# Patient Record
Sex: MALE | Race: WHITE | NOT HISPANIC OR LATINO | URBAN - METROPOLITAN AREA
[De-identification: names, ages, dates, MRNs, and addresses within clinical notes are randomized per-mention and may not be internally consistent; named-entity substitution may affect disease eponyms.]

---

## 2019-09-12 ENCOUNTER — HOSPITAL ENCOUNTER (OUTPATIENT)
Dept: GENERAL RADIOLOGY | Facility: HOSPITAL | Age: 44
Discharge: HOME | End: 2019-09-12
Attending: PHYSICIAN ASSISTANT

## 2021-03-17 ENCOUNTER — HOSPITAL ENCOUNTER (OUTPATIENT)
Dept: URGENT CARE | Facility: CLINIC | Age: 46
Discharge: HOME | End: 2021-03-17
Attending: NURSE PRACTITIONER

## 2021-03-17 LAB — URATE SERPL-MCNC: 7.5 MG/DL (ref 3.5–8.5)

## 2023-08-10 ENCOUNTER — TRANSCRIBE ORDERS (OUTPATIENT)
Dept: ADMINISTRATIVE | Facility: HOSPITAL | Age: 48
End: 2023-08-10
Payer: OTHER GOVERNMENT

## 2023-08-10 DIAGNOSIS — G89.29 CHRONIC PAIN OF RIGHT KNEE: Primary | ICD-10-CM

## 2023-08-10 DIAGNOSIS — M25.561 CHRONIC PAIN OF RIGHT KNEE: Primary | ICD-10-CM

## 2023-09-05 ENCOUNTER — OFFICE VISIT (OUTPATIENT)
Dept: ORTHOPEDIC SURGERY | Facility: CLINIC | Age: 48
End: 2023-09-05
Payer: OTHER GOVERNMENT

## 2023-09-05 VITALS
WEIGHT: 315 LBS | BODY MASS INDEX: 38.36 KG/M2 | HEIGHT: 76 IN | OXYGEN SATURATION: 94 % | DIASTOLIC BLOOD PRESSURE: 83 MMHG | HEART RATE: 84 BPM | SYSTOLIC BLOOD PRESSURE: 135 MMHG

## 2023-09-05 DIAGNOSIS — M17.11 OSTEOARTHRITIS OF RIGHT KNEE, UNSPECIFIED OSTEOARTHRITIS TYPE: ICD-10-CM

## 2023-09-05 DIAGNOSIS — M25.561 RIGHT KNEE PAIN, UNSPECIFIED CHRONICITY: Primary | ICD-10-CM

## 2023-09-05 RX ORDER — FENOFIBRATE 145 MG/1
145 TABLET, COATED ORAL DAILY
COMMUNITY

## 2023-09-05 RX ORDER — RIZATRIPTAN BENZOATE 10 MG/1
10 TABLET ORAL
COMMUNITY

## 2023-09-05 RX ORDER — GLUCOSAMINE SULFATE 500 MG
CAPSULE ORAL 3 TIMES DAILY
COMMUNITY

## 2023-09-05 RX ORDER — IBUPROFEN 800 MG/1
800 TABLET ORAL
COMMUNITY

## 2023-09-05 RX ORDER — ATORVASTATIN CALCIUM 10 MG/1
10 TABLET, FILM COATED ORAL DAILY
COMMUNITY

## 2023-09-05 RX ORDER — GABAPENTIN 300 MG/1
300 CAPSULE ORAL 4 TIMES DAILY
COMMUNITY

## 2023-09-05 RX ORDER — ALBUTEROL SULFATE 90 UG/1
2 AEROSOL, METERED RESPIRATORY (INHALATION)
COMMUNITY

## 2023-09-05 RX ORDER — CETIRIZINE HYDROCHLORIDE 10 MG/1
10 TABLET ORAL DAILY
COMMUNITY

## 2023-09-05 RX ORDER — HYDROCHLOROTHIAZIDE 12.5 MG/1
12.5 TABLET ORAL DAILY
COMMUNITY

## 2023-09-05 RX ORDER — CHLORAL HYDRATE 500 MG
CAPSULE ORAL
COMMUNITY

## 2023-09-05 NOTE — PROGRESS NOTES
"Chief Complaint  Pain and Initial Evaluation of the Right Knee     Subjective      Anshul Luna presents to [facility] for evaluation of the right knee. He reports a few months ago he had severe knee pain that caused him to not be able to walk or weight bear without pain. He denies injury or trauma. He is scheduled for an MRI. He locates pain to the medial knee. He reports it has improved. He takes ibuprofen with some relief.     Allergies   Allergen Reactions    Miconazole Unknown - Low Severity     Hives,swelling, trouble breathing,bumps in his throat    Simvastatin Unknown - Low Severity     Fatigue, severe joint pain    Bacitracin-Polymyxin B Hives    Chlorhexidine Gluconate Rash    Iodine Rash        Social History     Socioeconomic History    Marital status:    Tobacco Use    Smoking status: Never    Smokeless tobacco: Never        I reviewed the patient's chief complaint, history of present illness, review of systems, past medical history, surgical history, family history, social history, medications, and allergy list.     Review of Systems     Constitutional: Denies fevers, chills, weight loss  Cardiovascular: Denies chest pain, shortness of breath  Skin: Denies rashes, acute skin changes  Neurologic: Denies headache, loss of consciousness  MSK: Right knee pain      Vital Signs:   /83   Pulse 84   Ht 193 cm (76\")   Wt (!) 156 kg (345 lb)   SpO2 94%   BMI 41.99 kg/m²          Physical Exam  General: Alert. No acute distress    Ortho Exam      Right knee- full extension. Flexion 125 degrees. Mild medial joint line tenderness. Stable to varus/valgus stress. Stable to anterior/posterior drawer. Negative Lachman's. Positive EHL, FHL, GS and TA. Sensation intact to all 5 nerves of the foot. Positive pulses. Mild swelling. No effusion. Positive Eufemia's.     Procedures    X-Ray Report:  Right knee X-Ray  Indication: Evaluation of right knee " pain  AP/Lateral, Standing, and [place] view(s)  Findings: no acute fracture. Mild degenerative changes.   Prior studies available for comparison: no      Imaging Results (Most Recent)       Procedure Component Value Units Date/Time    XR Knee 3 View Right [[id number]] Resulted: 09/05/23 0832     Updated: 09/05/23 0833             Result Review :       No results found.           Assessment and Plan     Diagnoses and all orders for this visit:    1. Right knee pain, unspecified chronicity (Primary)  -     XR Knee 3 View Right    2. Osteoarthritis of right knee, unspecified osteoarthritis type        Discussed the treatment plan with the patient.  I reviewed the x-rays that were obtained today with the patient. Plan to proceed with MRI to evaluate for meniscus injury.     Call or return if worsening symptoms.    Follow Up     MRI results      Patient was given instructions and counseling regarding his condition or for health maintenance advice. Please see specific information pulled into the AVS if appropriate.     Scribed for Jaime Desouza MD by Марина Molina.  09/05/23   08:37 EDT    I have personally performed the services described in this document as scribed by the above individual and it is both accurate and complete. Jaime Desouza MD 09/05/23

## 2023-09-11 ENCOUNTER — HOSPITAL ENCOUNTER (OUTPATIENT)
Dept: MRI IMAGING | Facility: HOSPITAL | Age: 48
Discharge: HOME | End: 2023-09-11
Admitting: PHYSICIAN ASSISTANT
Payer: OTHER GOVERNMENT

## 2023-09-11 DIAGNOSIS — G89.29 CHRONIC PAIN OF RIGHT KNEE: ICD-10-CM

## 2023-09-11 DIAGNOSIS — M25.561 CHRONIC PAIN OF RIGHT KNEE: ICD-10-CM

## 2023-09-11 PROCEDURE — 73721 MRI JNT OF LWR EXTRE W/O DYE: CPT

## 2023-09-14 ENCOUNTER — OFFICE VISIT (OUTPATIENT)
Dept: ORTHOPEDIC SURGERY | Facility: CLINIC | Age: 48
End: 2023-09-14
Payer: OTHER GOVERNMENT

## 2023-09-14 VITALS
DIASTOLIC BLOOD PRESSURE: 101 MMHG | WEIGHT: 315 LBS | HEART RATE: 72 BPM | OXYGEN SATURATION: 95 % | BODY MASS INDEX: 38.36 KG/M2 | HEIGHT: 76 IN | SYSTOLIC BLOOD PRESSURE: 157 MMHG

## 2023-09-14 DIAGNOSIS — M17.11 PRIMARY OSTEOARTHRITIS OF RIGHT KNEE: ICD-10-CM

## 2023-09-14 DIAGNOSIS — S83.241A TEAR OF MEDIAL MENISCUS OF RIGHT KNEE, CURRENT, UNSPECIFIED TEAR TYPE, INITIAL ENCOUNTER: Primary | ICD-10-CM

## 2023-09-14 DIAGNOSIS — M71.21 BAKER CYST, RIGHT: ICD-10-CM

## 2023-09-14 NOTE — PROGRESS NOTES
"Chief Complaint  Pain and Follow-up of the Right Knee     Subjective      Anshul Luna presents to [facility] for follow up evaluation of the right knee. The patient recently had an MRI and is here today for those results. To review, He reports a few months ago he had severe knee pain that caused him to not be able to walk or weight bear without pain. He denies injury or trauma. He locates pain to the medial knee. He reports it has improved. He takes ibuprofen with some relief.      Allergies   Allergen Reactions    Miconazole Unknown - Low Severity     Hives,swelling, trouble breathing,bumps in his throat    Simvastatin Unknown - Low Severity     Fatigue, severe joint pain    Bacitracin-Polymyxin B Hives    Chlorhexidine Gluconate Rash    Iodine Rash        Social History     Socioeconomic History    Marital status:    Tobacco Use    Smoking status: Never    Smokeless tobacco: Never        I reviewed the patient's chief complaint, history of present illness, review of systems, past medical history, surgical history, family history, social history, medications, and allergy list.     Review of Systems     Constitutional: Denies fevers, chills, weight loss  Cardiovascular: Denies chest pain, shortness of breath  Skin: Denies rashes, acute skin changes  Neurologic: Denies headache, loss of consciousness  MSK: Right knee pain      Vital Signs:   BP (!) 157/101 Comment: advised to see pcp  Pulse 72   Ht 193 cm (76\")   Wt (!) 156 kg (345 lb)   SpO2 95%   BMI 41.99 kg/m²          Physical Exam  General: Alert. No acute distress    Ortho Exam      Right knee- full extension. Flexion 125 degrees. Mild medial joint line tenderness. Stable to varus/valgus stress. Stable to anterior/posterior drawer. Negative Lachman's. Positive EHL, FHL, GS and TA. Sensation intact to all 5 nerves of the foot. Positive pulses. Mild swelling. No effusion. Positive Eufemia's.  "     Procedures        Imaging Results (Most Recent)       None             Result Review :       XR Knee 3 View Right    Result Date: 9/8/2023  Narrative: X-Ray Report: Right knee X-Ray Indication: Evaluation of right knee pain AP/Lateral, Standing, and [place] view(s) Findings: no acute fracture. Mild degenerative changes. Prior studies available for comparison: no     MRI Knee Right Without Contrast    Result Date: 9/12/2023  Narrative: PROCEDURE: MRI KNEE RIGHT  WO CONTRAST  COMPARISON: None  INDICATIONS: CHRONIC ALL OVER RIGHT KNEE PAIN AND SWELLING.      TECHNIQUE: A complete multi-planar MRI was performed.   FINDINGS:  The cruciate ligaments, collateral ligaments, and extensor mechanism of the knee are intact.  Degenerative myxoid changes are seen throughout the medial meniscus.  There is a transverse tear involving the root of the posterior horn of the medial meniscus.  There is partial peripheral subluxation of the body segment towards the medial gutter.  There is no evidence for lateral meniscal tear.  Mild tricompartmental osteoarthritic degenerative changes are identified with evidence for articular cartilage irregularity/fissuring, subchondral edema/cystic change, and osteophytosis.  No significant full-thickness articular cartilage loss is noted.  No significant joint effusion is observed. No significant focal abnormal bone marrow signal is otherwise identified. The cortical margins are intact.  A small Baker's cyst is noted.      Impression:   1. Degenerative myxoid changes are seen throughout the medial meniscus.  There is a superimposed transverse tear involving the root of the posterior horn.  There is slight peripheral subluxation of the body segment towards the medial gutter. 2. Mild tricompartmental osteoarthritis.  No significant full-thickness articular cartilage loss is seen.       JOHANA ABBASI MD       Electronically Signed and Approved By: JOHANA ABBASI MD on 9/12/2023 at 2:07                      Assessment and Plan     Diagnoses and all orders for this visit:    1. Tear of medial meniscus of right knee, current, unspecified tear type, initial encounter (Primary)    2. Primary osteoarthritis of right knee    3. Nelson cyst, right        Discussed the treatment options with the patient, operative vs non-operative. Discussed the risks and benefits of a right knee arthroscopic partial medial menisectomy vs repair and chondroplasty vs conservative treatment. The patient expressed understanding and wished to proceed with conservative treatment. Order for physical therapy given today. Plan to continue ibuprofen.     Call or return if worsening symptoms.    Follow Up     6 weeks      Patient was given instructions and counseling regarding his condition or for health maintenance advice. Please see specific information pulled into the AVS if appropriate.     Scribed for Jaime Desouza MD by Марина Molina.  09/14/23   08:23 EDT    I have personally performed the services described in this document as scribed by the above individual and it is both accurate and complete. Jaime Desouza MD 09/14/23

## 2023-10-12 ENCOUNTER — TREATMENT (OUTPATIENT)
Dept: PHYSICAL THERAPY | Facility: CLINIC | Age: 48
End: 2023-10-12
Payer: OTHER GOVERNMENT

## 2023-10-12 DIAGNOSIS — G89.29 CHRONIC PAIN OF RIGHT KNEE: ICD-10-CM

## 2023-10-12 DIAGNOSIS — M25.561 CHRONIC PAIN OF RIGHT KNEE: ICD-10-CM

## 2023-10-12 DIAGNOSIS — M17.11 PRIMARY OSTEOARTHRITIS OF RIGHT KNEE: ICD-10-CM

## 2023-10-12 DIAGNOSIS — S83.241A TEAR OF MEDIAL MENISCUS OF RIGHT KNEE, CURRENT, UNSPECIFIED TEAR TYPE, INITIAL ENCOUNTER: Primary | ICD-10-CM

## 2023-10-12 NOTE — PROGRESS NOTES
Physical Therapy Initial Evaluation and Plan of Care  [address], [address] [place] [address]        Patient: Anshul Luna   : 1975  Diagnosis/ICD-10 Code:  Tear of medial meniscus of right knee, current, unspecified tear type, initial encounter [S83.241A]  Referring practitioner: Jaime Desouza MD  Date of Initial Visit: 10/12/2023  Today's Date: 10/12/2023  Patient seen for 1 sessions         R knee x-ray results:  IMPRESSION: Mild changes of osteoarthritis with small osteophytes in   all 3 compartments.     R knee MRI results:  1. Degenerative myxoid changes are seen throughout the medial meniscus.  There is a superimposed   transverse tear involving the root of the posterior horn.  There is slight peripheral subluxation   of the body segment towards the medial gutter.  2. Mild tricompartmental osteoarthritis.  No significant full-thickness articular cartilage loss is   seen.    Subjective Questionnaire: LEFS: 44/80      Subjective Evaluation    History of Present Illness  Mechanism of injury: Pt reports that he has been having R knee pain about 20 years.  Pt reports hx of R patellar dislocation, chronic lower back pain, R ankle microfracture sx ().  Pt reports that R knee pain worsened 2023 with no LISSETT.  Pt reports that all activities increase his pain, particularly going up/down the stairs.  Pt reports intermittent R knee buckling but no catching.  Pt reports that he is taking Ibuprofen and gabapentin.  Pt reports that he is not currently working.  Pt reports intermittent swelling in R knee but this is improved.  Pt reports pain is localized in R medial and anterior knee.      Pain  Current pain ratin  At best pain ratin  At worst pain rating: 10  Quality: dull ache and discomfort  Aggravating factors: ambulation, squatting, lifting, prolonged positioning, repetitive movement, standing, stairs and movement  Progression: improved    Social Support  Lives with:  spouse    Diagnostic Tests  X-ray: abnormal  MRI studies: abnormal    Treatments  Previous treatment: physical therapy  Patient Goals  Patient goals for therapy: decreased edema, decreased pain, increased motion, increased strength and return to sport/leisure activities             Objective          Static Posture     Hip   Hip (Left): Externally rotated.   Hip (Right): Externally rotated.     Knee   Genu valgus.   Knee (Left): Recurvatum.   Knee (Right): Recurvatum.     Tenderness     Right Knee   Tenderness in the medial joint line. No tenderness in the inferior patella, ITB, lateral joint line, lateral patella, LCL (distal), LCL (proximal), MCL (distal), MCL (proximal), medial patella, patellar tendon, pes anserinus, superior patella and tibial tubercle.     Active Range of Motion   Left Knee   Flexion: 128 degrees   Extension: 9 (hyperextension) degrees     Right Knee   Flexion: 125 degrees   Extension: 6 (hyperextension) degrees     Passive Range of Motion     Right Knee   Flexion: WFL and with pain  Extension: WFL    Patellar Mobility     Right Knee Patellar tendons within functional limits include the medial and lateral. Hypomobile in the superior and inferior patellar tendon(s).     Strength/Myotome Testing     Left Hip   Planes of Motion   Flexion: 5  Extension: 4+  Abduction: 4+    Right Hip   Planes of Motion   Flexion: 5  Extension: 4+  Abduction: 4+    Left Knee   Flexion: 5  Extension: 5    Right Knee   Flexion: 4+  Extension: 4+  Quadriceps contraction: good    Left Ankle/Foot   Dorsiflexion: 5    Right Ankle/Foot   Dorsiflexion: 4+ (painful)    Tests     Right Knee   Positive medial Eufemia.   Negative anterior drawer, lateral Eufemia, patellar apprehension, patellar compression, posterior drawer, valgus stress test at 0 degrees, valgus stress test at 30 degrees, varus stress test at 0 degrees and varus stress test at 30 degrees.     Ambulation     Observational Gait   Walking speed within  functional limits.      General Comments     Knee Comments  No swelling noted  Bilateral hamstring tightness noted  Light touch sensation normal in bilateral lower extremities           Assessment & Plan       Assessment  Impairments: abnormal gait, abnormal or restricted ROM, activity intolerance, impaired physical strength, lacks appropriate home exercise program and pain with function   Functional limitations: lifting, sleeping, walking, uncomfortable because of pain, sitting, standing and unable to perform repetitive tasks   Assessment details: Patient presents with signs/symptoms consistent with R knee meniscus injury including bilateral hip and R knee weakness, gait abnormalities, abnormal MRI results and reports of pain limiting function during ADLs as shown on the LEFS.  Patient would benefit from skilled PT services in order to address deficits limiting function at this time.  HEP was given to patient this session and education on HEP/diagnosis provided to patient.             Goals  Plan Goals: 1. The patient reports R knee pain   LTG 1: 12 weeks:  The patient will report 1/10 R knee pain or less in order to allow patient to complete prolonged walking, standing, stairs and other ADLs with decreased pain/difficulty.    STATUS:  New   STG 1a:  6 weeks:  The patient will report 3/10 R knee pain or less in order to allow patient to complete prolonged walking, standing, stairs and other ADLs with decreased pain/difficulty.    STATUS:  New    2. The patient has limited strength of the right knee.   LTG 2: 12 weeks: The patient will demonstrate 5/5 strength for R knee flexion and extension in order to allow patient improved joint stability    STATUS:  New     3. The patient has gait dysfunction.   LTG 3: 12 weeks:  The patient will ambulate without assistive device, independently, for community distances with minimal limp to the R lower extremity in order to improve mobility and allow patient to perform  activities such as grocery shopping with greater ease.    STATUS:  New    4. Mobility: Walking/Moving Around Functional Limitation     LTG 4: 12 weeks:  The patient will demonstrate 19 % limitation by achieving a score of 65/80 on the LEFS.    STATUS:  New   STG 4 a: 6 weeks:  The patient will demonstrate  31% limitation by achieving a score of 55/80 on the LEFS.      STATUS:  New    Plan  Therapy options: will be seen for skilled therapy services  Planned modality interventions: TENS, electrical stimulation/Russian stimulation, thermotherapy (hydrocollator packs) and cryotherapy  Planned therapy interventions: manual therapy, ADL retraining, balance/weight-bearing training, neuromuscular re-education, body mechanics training, postural training, soft tissue mobilization, flexibility, spinal/joint mobilization, functional ROM exercises, strengthening, gait training, stretching, home exercise program, therapeutic activities, transfer training and joint mobilization  Frequency: 2x week  Duration in weeks: 12  Treatment plan discussed with: patient        Timed:         Manual Therapy:    8     mins  25407;     Therapeutic Exercise:    15     mins  29991;     Neuromuscular Eliane:    0    mins  18993;    Therapeutic Activity:     0     mins  85526;     Gait Trainin     mins  86967;     Ultrasound:     0     mins  33858;    Ionto                               0    mins   43350  Self-care  __0__ mins 91190    Un-Timed:  Electrical Stimulation:    0     mins  74927 ( );  Traction     0     mins 75085  Low Eval     20     Mins  90875  Mod Eval     0     Mins  22530  High Eval                       0     Mins  20349  Hot pack     0     Mins    Cold pack                       0     Mins      Timed Treatment:   23   mins   Total Treatment:     43   mins    PT SIGNATURE: Dimple Sepulveda PT      Electronically signed 10/12/2023  [place] License: [id number]    Initial Certification    Certification Period: 10/12/2023 thru  1/9/2024  NPI: [id number]  I certify that the therapy services are furnished while this patient is under my care.  The services outlined above are required by this patient, and will be reviewed every 90 days.     PHYSICIAN: Jaime Desouza MD      DATE:     Please sign and return via fax to [phone].. Thank you, [facility].

## 2023-10-23 ENCOUNTER — TREATMENT (OUTPATIENT)
Dept: PHYSICAL THERAPY | Facility: CLINIC | Age: 48
End: 2023-10-23
Payer: OTHER GOVERNMENT

## 2023-10-23 DIAGNOSIS — M17.11 PRIMARY OSTEOARTHRITIS OF RIGHT KNEE: ICD-10-CM

## 2023-10-23 DIAGNOSIS — M25.561 CHRONIC PAIN OF RIGHT KNEE: ICD-10-CM

## 2023-10-23 DIAGNOSIS — G89.29 CHRONIC PAIN OF RIGHT KNEE: ICD-10-CM

## 2023-10-23 DIAGNOSIS — S83.241A TEAR OF MEDIAL MENISCUS OF RIGHT KNEE, CURRENT, UNSPECIFIED TEAR TYPE, INITIAL ENCOUNTER: Primary | ICD-10-CM

## 2023-10-23 PROCEDURE — 97110 THERAPEUTIC EXERCISES: CPT | Performed by: PHYSICAL THERAPIST

## 2023-10-23 PROCEDURE — 97140 MANUAL THERAPY 1/> REGIONS: CPT | Performed by: PHYSICAL THERAPIST

## 2023-10-23 PROCEDURE — 97530 THERAPEUTIC ACTIVITIES: CPT | Performed by: PHYSICAL THERAPIST

## 2023-10-23 NOTE — PROGRESS NOTES
Physical Therapy Daily Treatment Note  [address], [address] [place] [address]        Patient: Anshul Luna   : 1975  Diagnosis/ICD-10 Code:  Tear of medial meniscus of right knee, current, unspecified tear type, initial encounter [S83.241A]  Referring practitioner: Jaime Desouza MD  Date of Initial Visit: Type: THERAPY  Noted: 10/12/2023  Today's Date: 10/23/2023  Patient seen for 2 sessions             Subjective   Anshul Luna reports having 4/10 pain in his right knee upon arrival today.      Objective     See Exercise and Manual Logs for complete treatment.       Assessment/Plan    Pt tolerated therapy session well - with progression of therapeutic exercises, CKC-Functional activities, and Manual therapy. He has improved, but continues to have deficits in Right Hip/Knee ROM,  Strength, and Stability; limiting function and ability to perform ADLs at this time.    Progress per Plan of Care - as tolerated.           Timed:  Manual Therapy:    8     mins  59213;  Therapeutic Exercise:    22     mins  87087;     Neuromuscular Eliane:    0    mins  68100;    Therapeutic Activity:     8     mins  99070;     Gait Trainin     mins  35325;     Ultrasound:     0     mins  85434;    Electrical Stimulation:    0     mins  51521;  Iontophoresis     0     mins  61363    Untimed:  Electrical Stimulation:    0     mins  64761 ( );  Mechanical Traction:    0     mins  96135;   Fluidotherapy     0     mins  67764  Hot pack     0     mins  53485  Cold pack     0     mins  25906    Timed Treatment:   38   mins   Total Treatment:     38   mins        Samantha Chavez PTA  Physical Therapist Assistant

## 2023-10-30 ENCOUNTER — TREATMENT (OUTPATIENT)
Dept: PHYSICAL THERAPY | Facility: CLINIC | Age: 48
End: 2023-10-30
Payer: OTHER GOVERNMENT

## 2023-10-30 DIAGNOSIS — G89.29 CHRONIC PAIN OF RIGHT KNEE: ICD-10-CM

## 2023-10-30 DIAGNOSIS — S83.241A TEAR OF MEDIAL MENISCUS OF RIGHT KNEE, CURRENT, UNSPECIFIED TEAR TYPE, INITIAL ENCOUNTER: Primary | ICD-10-CM

## 2023-10-30 DIAGNOSIS — M25.561 CHRONIC PAIN OF RIGHT KNEE: ICD-10-CM

## 2023-10-30 DIAGNOSIS — M17.11 PRIMARY OSTEOARTHRITIS OF RIGHT KNEE: ICD-10-CM

## 2023-10-30 PROCEDURE — 97110 THERAPEUTIC EXERCISES: CPT | Performed by: PHYSICAL THERAPIST

## 2023-10-30 PROCEDURE — 97140 MANUAL THERAPY 1/> REGIONS: CPT | Performed by: PHYSICAL THERAPIST

## 2023-10-30 PROCEDURE — 97530 THERAPEUTIC ACTIVITIES: CPT | Performed by: PHYSICAL THERAPIST

## 2023-10-30 NOTE — PROGRESS NOTES
Physical Therapy Daily Treatment Note  [address], [address], [place] [address]      Patient: Anshul Luna   : 1975  Diagnosis/ICD-10 Code:  Tear of medial meniscus of right knee, current, unspecified tear type, initial encounter [S83.241A]  Referring practitioner: Jaime Desouza MD  Date of Initial Visit: Type: THERAPY  Noted: 10/12/2023  Today's Date: 10/30/2023  Patient seen for 3 sessions           Subjective   Anshul Luna reports: mild R knee pain at beginning of session today.    Objective   See Exercise, Manual, and Modality Logs for complete treatment.       Assessment/Plan  Patient tolerated all exercise progressions and manual therapy well today but continues to demonstrate bilateral hip and R knee strength deficits limiting function. Continue to progress per patient tolerance.    Progress per Plan of Care           Timed:  Manual Therapy:    8     mins  33329;  Therapeutic Exercise:    22     mins  65315;     Neuromuscular Eliane:    0    mins  64762;    Therapeutic Activity:     8     mins  31141;     Gait Trainin     mins  66752;     Ultrasound:     0     mins  12101;    Electrical Stimulation:    0     mins  86974;  Iontophoresis     0     mins  00474    Untimed:  Electrical Stimulation:    0     mins  44050 ( );  Mechanical Traction:    0     mins  58263;   Fluidotherapy     0     mins  64711  Hot pack     0     Mins    Cold pack                       0     Mins     Timed Treatment:   38   mins   Total Treatment:     45   mins        Dimple Sepulveda PT    Electronically signed [unfilled]  [place] License: [id number]  NPI number: [id number]

## 2023-11-06 ENCOUNTER — TREATMENT (OUTPATIENT)
Dept: PHYSICAL THERAPY | Facility: CLINIC | Age: 48
End: 2023-11-06
Payer: OTHER GOVERNMENT

## 2023-11-06 DIAGNOSIS — S83.241A TEAR OF MEDIAL MENISCUS OF RIGHT KNEE, CURRENT, UNSPECIFIED TEAR TYPE, INITIAL ENCOUNTER: Primary | ICD-10-CM

## 2023-11-06 DIAGNOSIS — M25.561 CHRONIC PAIN OF RIGHT KNEE: ICD-10-CM

## 2023-11-06 DIAGNOSIS — M17.11 PRIMARY OSTEOARTHRITIS OF RIGHT KNEE: ICD-10-CM

## 2023-11-06 DIAGNOSIS — G89.29 CHRONIC PAIN OF RIGHT KNEE: ICD-10-CM

## 2023-11-06 PROCEDURE — 97110 THERAPEUTIC EXERCISES: CPT | Performed by: PHYSICAL THERAPIST

## 2023-11-06 PROCEDURE — 97530 THERAPEUTIC ACTIVITIES: CPT | Performed by: PHYSICAL THERAPIST

## 2023-11-06 NOTE — PROGRESS NOTES
Physical Therapy Daily Treatment Note  [address], [address], [place] [address]      Patient: Anshul Luna   : 1975  Diagnosis/ICD-10 Code:  Tear of medial meniscus of right knee, current, unspecified tear type, initial encounter [S83.241A]  Referring practitioner: Jaime Desouza MD  Date of Initial Visit: Type: THERAPY  Noted: 10/12/2023  Today's Date: 2023  Patient seen for 4 sessions             Subjective   Anshul Luna reports: no pain at beginning of session today.    Objective   See Exercise, Manual, and Modality Logs for complete treatment.       Assessment/Plan  Patient tolerated all exercise progressions and manual therapy well today but continues to demonstrate bilateral hip and R knee strength deficits limiting function. Continue to progress per patient tolerance.   Progress per Plan of Care           Timed:  Manual Therapy:    5     mins  05843;  Therapeutic Exercise:    25     mins  05469;     Neuromuscular Eliane:    0    mins  59595;    Therapeutic Activity:     8     mins  95116;     Gait Trainin     mins  60333;     Ultrasound:     0     mins  30919;    Electrical Stimulation:    0     mins  87911;  Iontophoresis     0     mins  84845    Untimed:  Electrical Stimulation:    0     mins  42827 ( );  Mechanical Traction:    0     mins  46905;   Fluidotherapy     0     mins  82536  Hot pack     0     Mins    Cold pack                       0     Mins     Timed Treatment:   38   mins   Total Treatment:     43   mins        Dimple Sepulveda PT    Electronically signed [unfilled]  [place] License: [id number]  NPI number: [id number]

## 2023-11-09 PROBLEM — M17.11 PRIMARY OSTEOARTHRITIS OF RIGHT KNEE: Status: ACTIVE | Noted: 2023-11-09

## 2023-11-09 PROBLEM — M71.21 BAKER CYST, RIGHT: Status: ACTIVE | Noted: 2023-11-09

## 2023-11-09 PROBLEM — M25.561 RIGHT KNEE PAIN: Status: ACTIVE | Noted: 2023-11-09

## 2023-11-09 PROBLEM — S83.241A TEAR OF MEDIAL MENISCUS OF RIGHT KNEE, CURRENT: Status: ACTIVE | Noted: 2023-11-09

## 2023-12-13 ENCOUNTER — DOCUMENTATION (OUTPATIENT)
Dept: PHYSICAL THERAPY | Facility: CLINIC | Age: 48
End: 2023-12-13
Payer: OTHER GOVERNMENT

## 2023-12-13 NOTE — PROGRESS NOTES
Pt discharged per no show policy.  Objective measurements/progress towards goals unable to be assessed.

## 2024-09-23 ENCOUNTER — OFFICE VISIT (OUTPATIENT)
Dept: INTERNAL MEDICINE | Age: 49
End: 2024-09-23
Payer: OTHER GOVERNMENT

## 2024-09-23 VITALS
BODY MASS INDEX: 38.36 KG/M2 | SYSTOLIC BLOOD PRESSURE: 148 MMHG | HEART RATE: 84 BPM | RESPIRATION RATE: 18 BRPM | OXYGEN SATURATION: 98 % | WEIGHT: 315 LBS | HEIGHT: 76 IN | TEMPERATURE: 98.8 F | DIASTOLIC BLOOD PRESSURE: 90 MMHG

## 2024-09-23 DIAGNOSIS — Z12.11 SCREENING FOR COLON CANCER: ICD-10-CM

## 2024-09-23 DIAGNOSIS — F41.9 ANXIETY AND DEPRESSION: ICD-10-CM

## 2024-09-23 DIAGNOSIS — E78.2 MIXED HYPERLIPIDEMIA: ICD-10-CM

## 2024-09-23 DIAGNOSIS — R73.01 IMPAIRED FASTING GLUCOSE: ICD-10-CM

## 2024-09-23 DIAGNOSIS — F32.A ANXIETY AND DEPRESSION: ICD-10-CM

## 2024-09-23 DIAGNOSIS — I10 BENIGN ESSENTIAL HYPERTENSION: ICD-10-CM

## 2024-09-23 DIAGNOSIS — Z76.89 ENCOUNTER TO ESTABLISH CARE: Primary | ICD-10-CM

## 2024-09-23 DIAGNOSIS — F43.12 CHRONIC POST-TRAUMATIC STRESS DISORDER: ICD-10-CM

## 2024-09-23 DIAGNOSIS — E66.01 CLASS 3 SEVERE OBESITY DUE TO EXCESS CALORIES WITH SERIOUS COMORBIDITY AND BODY MASS INDEX (BMI) OF 40.0 TO 44.9 IN ADULT: ICD-10-CM

## 2024-09-23 DIAGNOSIS — Z11.59 NEED FOR HEPATITIS C SCREENING TEST: ICD-10-CM

## 2024-09-23 PROBLEM — E29.1 HYPOGONADISM MALE: Status: ACTIVE | Noted: 2024-09-23

## 2024-09-23 PROBLEM — R41.840 ATTENTION OR CONCENTRATION DEFICIT: Status: ACTIVE | Noted: 2022-05-16

## 2024-09-23 PROBLEM — Z80.41 FAMILY HISTORY OF OVARIAN CANCER: Status: ACTIVE | Noted: 2022-10-10

## 2024-09-23 PROBLEM — E66.813 CLASS 3 SEVERE OBESITY DUE TO EXCESS CALORIES WITH SERIOUS COMORBIDITY AND BODY MASS INDEX (BMI) OF 40.0 TO 44.9 IN ADULT: Status: ACTIVE | Noted: 2024-09-23

## 2024-09-23 PROBLEM — Z80.3 FAMILY HISTORY OF BREAST CANCER: Status: ACTIVE | Noted: 2022-10-10

## 2024-09-23 PROBLEM — Z80.42 FAMILY HISTORY OF PROSTATE CANCER: Status: ACTIVE | Noted: 2022-10-10

## 2024-09-23 PROCEDURE — 99204 OFFICE O/P NEW MOD 45 MIN: CPT

## 2024-09-23 RX ORDER — FLUTICASONE PROPIONATE AND SALMETEROL 100; 50 UG/1; UG/1
1 POWDER RESPIRATORY (INHALATION)
COMMUNITY

## 2024-09-26 ENCOUNTER — PATIENT ROUNDING (BHMG ONLY) (OUTPATIENT)
Dept: INTERNAL MEDICINE | Age: 49
End: 2024-09-26
Payer: OTHER GOVERNMENT

## 2024-10-15 ENCOUNTER — LAB (OUTPATIENT)
Dept: LAB | Facility: HOSPITAL | Age: 49
End: 2024-10-15
Payer: OTHER GOVERNMENT

## 2024-10-15 DIAGNOSIS — Z11.59 NEED FOR HEPATITIS C SCREENING TEST: ICD-10-CM

## 2024-10-15 DIAGNOSIS — R73.01 IMPAIRED FASTING GLUCOSE: ICD-10-CM

## 2024-10-15 DIAGNOSIS — I10 BENIGN ESSENTIAL HYPERTENSION: ICD-10-CM

## 2024-10-15 DIAGNOSIS — E78.2 MIXED HYPERLIPIDEMIA: ICD-10-CM

## 2024-10-15 LAB
ALBUMIN SERPL-MCNC: 4.1 G/DL (ref 3.5–5.2)
ALBUMIN/GLOB SERPL: 1.3 G/DL
ALP SERPL-CCNC: 105 U/L (ref 39–117)
ALT SERPL W P-5'-P-CCNC: 46 U/L (ref 1–41)
ANION GAP SERPL CALCULATED.3IONS-SCNC: 9.1 MMOL/L (ref 5–15)
AST SERPL-CCNC: 37 U/L (ref 1–40)
BASOPHILS # BLD AUTO: 0.08 10*3/MM3 (ref 0–0.2)
BASOPHILS NFR BLD AUTO: 1.1 % (ref 0–1.5)
BILIRUB SERPL-MCNC: 0.6 MG/DL (ref 0–1.2)
BUN SERPL-MCNC: 17 MG/DL (ref 6–20)
BUN/CREAT SERPL: 17 (ref 7–25)
CALCIUM SPEC-SCNC: 9.4 MG/DL (ref 8.6–10.5)
CHLORIDE SERPL-SCNC: 101 MMOL/L (ref 98–107)
CHOLEST SERPL-MCNC: 185 MG/DL (ref 0–200)
CO2 SERPL-SCNC: 25.9 MMOL/L (ref 22–29)
CREAT SERPL-MCNC: 1 MG/DL (ref 0.76–1.27)
DEPRECATED RDW RBC AUTO: 39.1 FL (ref 37–54)
EGFRCR SERPLBLD CKD-EPI 2021: 93.4 ML/MIN/1.73
EOSINOPHIL # BLD AUTO: 0.23 10*3/MM3 (ref 0–0.4)
EOSINOPHIL NFR BLD AUTO: 3 % (ref 0.3–6.2)
ERYTHROCYTE [DISTWIDTH] IN BLOOD BY AUTOMATED COUNT: 12.2 % (ref 12.3–15.4)
GLOBULIN UR ELPH-MCNC: 3.1 GM/DL
GLUCOSE SERPL-MCNC: 90 MG/DL (ref 65–99)
HBA1C MFR BLD: 5.3 % (ref 4.8–5.6)
HCT VFR BLD AUTO: 46.2 % (ref 37.5–51)
HCV AB SER QL: NORMAL
HDLC SERPL-MCNC: 26 MG/DL (ref 40–60)
HGB BLD-MCNC: 15.9 G/DL (ref 13–17.7)
IMM GRANULOCYTES # BLD AUTO: 0.03 10*3/MM3 (ref 0–0.05)
IMM GRANULOCYTES NFR BLD AUTO: 0.4 % (ref 0–0.5)
LDLC SERPL CALC-MCNC: 121 MG/DL (ref 0–100)
LDLC/HDLC SERPL: 4.48 {RATIO}
LYMPHOCYTES # BLD AUTO: 1.62 10*3/MM3 (ref 0.7–3.1)
LYMPHOCYTES NFR BLD AUTO: 21.4 % (ref 19.6–45.3)
MCH RBC QN AUTO: 30.4 PG (ref 26.6–33)
MCHC RBC AUTO-ENTMCNC: 34.4 G/DL (ref 31.5–35.7)
MCV RBC AUTO: 88.3 FL (ref 79–97)
MONOCYTES # BLD AUTO: 0.41 10*3/MM3 (ref 0.1–0.9)
MONOCYTES NFR BLD AUTO: 5.4 % (ref 5–12)
NEUTROPHILS NFR BLD AUTO: 5.19 10*3/MM3 (ref 1.7–7)
NEUTROPHILS NFR BLD AUTO: 68.7 % (ref 42.7–76)
NRBC BLD AUTO-RTO: 0 /100 WBC (ref 0–0.2)
PLATELET # BLD AUTO: 206 10*3/MM3 (ref 140–450)
PMV BLD AUTO: 9.2 FL (ref 6–12)
POTASSIUM SERPL-SCNC: 4.2 MMOL/L (ref 3.5–5.2)
PROT SERPL-MCNC: 7.2 G/DL (ref 6–8.5)
RBC # BLD AUTO: 5.23 10*6/MM3 (ref 4.14–5.8)
SODIUM SERPL-SCNC: 136 MMOL/L (ref 136–145)
T4 FREE SERPL-MCNC: 0.98 NG/DL (ref 0.92–1.68)
TRIGL SERPL-MCNC: 212 MG/DL (ref 0–150)
TSH SERPL DL<=0.05 MIU/L-ACNC: 2.04 UIU/ML (ref 0.27–4.2)
VLDLC SERPL-MCNC: 38 MG/DL (ref 5–40)
WBC NRBC COR # BLD AUTO: 7.56 10*3/MM3 (ref 3.4–10.8)

## 2024-10-15 PROCEDURE — 84439 ASSAY OF FREE THYROXINE: CPT

## 2024-10-15 PROCEDURE — 80053 COMPREHEN METABOLIC PANEL: CPT

## 2024-10-15 PROCEDURE — 84443 ASSAY THYROID STIM HORMONE: CPT

## 2024-10-15 PROCEDURE — 83036 HEMOGLOBIN GLYCOSYLATED A1C: CPT

## 2024-10-15 PROCEDURE — 80061 LIPID PANEL: CPT

## 2024-10-15 PROCEDURE — 86803 HEPATITIS C AB TEST: CPT

## 2024-10-15 PROCEDURE — 36415 COLL VENOUS BLD VENIPUNCTURE: CPT

## 2024-10-15 PROCEDURE — 85025 COMPLETE CBC W/AUTO DIFF WBC: CPT

## 2024-10-17 ENCOUNTER — TELEPHONE (OUTPATIENT)
Dept: INTERNAL MEDICINE | Age: 49
End: 2024-10-17
Payer: OTHER GOVERNMENT

## 2024-10-17 NOTE — TELEPHONE ENCOUNTER
----- Message from Lizeth Campoverde sent at 10/17/2024  1:54 PM EDT -----  AST and ALT are improving.  Cholesterol still elevated, thyroid function A1c, kidney and liver function all look great.  We will further discuss labs at his upcoming office visit.  Thank you

## 2024-10-17 NOTE — TELEPHONE ENCOUNTER
"Relay     \"    Lizeth Stanford, APRN  10/17/2024  1:54 PM EDT       AST and ALT are improving.  Cholesterol still elevated, thyroid function A1c, kidney and liver function all look great.  We will further discuss labs at his upcoming office visit.  Thank you   \"                "

## 2024-10-17 NOTE — TELEPHONE ENCOUNTER
Name: Jeremy Anshul Herve    Relationship: Self    Best Callback Number:     [phone]       HUB PROVIDED THE RELAY MESSAGE FROM THE OFFICE   PATIENT VOICED UNDERSTANDING AND HAS NO FURTHER QUESTIONS AT THIS TIME    ADDITIONAL INFORMATION: NO FURTHER QUESTIONS AT THIS TIME.

## 2024-10-30 NOTE — PROGRESS NOTES
Chief Complaint  Annual Exam (48 year old male here today for his annual physical)    History of Present Illness  SUBJECTIVE  Anshul Luna presents to [facility] for his annual physical exam.    Denies family history of prostate/colon cancer.    He is doing well overall.    He denies any chest pain, shortness of breath, potation's, nausea, vomiting, diarrhea, issues of bowel or bladder habits.    Past Medical History:   Diagnosis Date    Anxiety     Arthritis     Depression     Headache     Hyperlipidemia       History reviewed. No pertinent family history.   Past Surgical History:   Procedure Laterality Date    ANKLE SURGERY Right 2009    CHOLECYSTECTOMY  2009    ORIF FINGER / THUMB FRACTURE Left 2014        Current Outpatient Medications:     albuterol sulfate  (90 Base) MCG/ACT inhaler, Inhale 2 puffs., Disp: , Rfl:     atorvastatin (LIPITOR) 10 MG tablet, Take 1 tablet by mouth Daily., Disp: , Rfl:     cetirizine (zyrTEC) 10 MG tablet, Take 1 tablet by mouth Daily., Disp: , Rfl:     fenofibrate (TRICOR) 145 MG tablet, Take 1 tablet by mouth Daily., Disp: , Rfl:     Fluticasone-Salmeterol (ADVAIR/WIXELA) 100-50 MCG/ACT DISKUS, Inhale 1 puff 2 (Two) Times a Day., Disp: , Rfl:     gabapentin (NEURONTIN) 300 MG capsule, Take 1 capsule by mouth 4 (Four) Times a Day., Disp: , Rfl:     Glucosamine 500 MG capsule, Take  by mouth 3 (Three) Times a Day., Disp: , Rfl:     hydroCHLOROthiazide (HYDRODIURIL) 12.5 MG tablet, Take 1 tablet by mouth Daily., Disp: , Rfl:     ibuprofen (ADVIL,MOTRIN) 800 MG tablet, Take 1 tablet by mouth., Disp: , Rfl:     Omega-3 Fatty Acids (fish oil) 1000 MG capsule capsule, Take  by mouth., Disp: , Rfl:     rizatriptan (MAXALT) 10 MG tablet, Take 1 tablet by mouth., Disp: , Rfl:     sertraline (ZOLOFT) 50 MG tablet, Take 1 tablet by mouth Daily., Disp: , Rfl:     losartan (Cozaar) 25 MG tablet, Take 1 tablet by mouth Daily., Disp: 30  "tablet, Rfl: 2    Tirzepatide-Weight Management (ZEPBOUND) 10 MG/0.5ML solution auto-injector, Inject 0.5 mL under the skin into the appropriate area as directed 1 (One) Time Per Week., Disp: 2 mL, Rfl: 0    OBJECTIVE  Vital Signs:   /90 (BP Location: Left arm, Patient Position: Sitting)   Pulse 66   Temp 97.8 °F (36.6 °C) (Temporal)   Resp 18   Ht 193 cm (75.98\")   Wt (!) 156 kg (344 lb 6.4 oz)   SpO2 98%   BMI 41.94 kg/m²    Estimated body mass index is 41.94 kg/m² as calculated from the following:    Height as of this encounter: 193 cm (75.98\").    Weight as of this encounter: 156 kg (344 lb 6.4 oz).     Wt Readings from Last 3 Encounters:   11/04/24 (!) 156 kg (344 lb 6.4 oz)   09/23/24 (!) 159 kg (351 lb 9.6 oz)   09/14/23 (!) 156 kg (345 lb)     BP Readings from Last 3 Encounters:   11/04/24 148/90   09/23/24 148/90   09/14/23 (!) 157/101       Physical Exam  Vitals and nursing note reviewed.   Constitutional:       Appearance: Normal appearance.   HENT:      Head: Normocephalic.      Right Ear: Tympanic membrane normal.      Left Ear: Tympanic membrane normal.   Eyes:      Extraocular Movements: Extraocular movements intact.      Conjunctiva/sclera: Conjunctivae normal.   Cardiovascular:      Rate and Rhythm: Normal rate and regular rhythm.      Heart sounds: Normal heart sounds. No murmur heard.  Pulmonary:      Effort: Pulmonary effort is normal.      Breath sounds: Normal breath sounds. No wheezing or rales.   Abdominal:      General: Bowel sounds are normal.      Palpations: Abdomen is soft.      Tenderness: There is no abdominal tenderness. There is no guarding.   Musculoskeletal:         General: No swelling. Normal range of motion.      Cervical back: Normal range of motion and neck supple.      Right lower leg: Edema (Trace) present.      Left lower leg: Edema (Trace) present.   Skin:     General: Skin is warm and dry.   Neurological:      General: No focal deficit present.      Mental " Status: He is alert and oriented to person, place, and time. Mental status is at baseline.   Psychiatric:         Mood and Affect: Mood normal.         Behavior: Behavior normal.         Thought Content: Thought content normal.         Judgment: Judgment normal.          Result Review    CMP          10/15/2024    09:00   CMP   Glucose 90    BUN 17    Creatinine 1.00    EGFR 93.4    Sodium 136    Potassium 4.2    Chloride 101    Calcium 9.4    Total Protein 7.2    Albumin 4.1    Globulin 3.1    Total Bilirubin 0.6    Alkaline Phosphatase 105    AST (SGOT) 37    ALT (SGPT) 46    Albumin/Globulin Ratio 1.3    BUN/Creatinine Ratio 17.0    Anion Gap 9.1      CBC w/diff          10/15/2024    09:00   CBC w/Diff   WBC 7.56    RBC 5.23    Hemoglobin 15.9    Hematocrit 46.2    MCV 88.3    MCH 30.4    MCHC 34.4    RDW 12.2    Platelets 206    Neutrophil Rel % 68.7    Immature Granulocyte Rel % 0.4    Lymphocyte Rel % 21.4    Monocyte Rel % 5.4    Eosinophil Rel % 3.0    Basophil Rel % 1.1      Lipid Panel          10/15/2024    09:00   Lipid Panel   Total Cholesterol 185    Triglycerides 212    HDL Cholesterol 26    VLDL Cholesterol 38    LDL Cholesterol  121    LDL/HDL Ratio 4.48      TSH          10/15/2024    09:00   TSH   TSH 2.040        No Images in the past 120 days found..     The above data has been reviewed by KEVIN Sanchez 11/04/2024 12:05 EDT.          Patient Care Team:  Lizeth Campoverde APRN as PCP - General (Internal Medicine)            ASSESSMENT & PLAN    Diagnoses and all orders for this visit:    1. Annual physical exam (Primary)  Assessment & Plan:  Colon cancer screening-pending   Tobacco use-denies smoking, does have history of smoking quit several years ago/admits to chewing tobacco  Alcohol use-seldom  Recommend regular dental and eye exams.  He declines flu and COVID-vaccine.  He denies any family history of prostate or colon cancer.  He also discussed healthy diet and regular exercise.      2.  Class 3 severe obesity due to excess calories with serious comorbidity and body mass index (BMI) of 40.0 to 44.9 in adult  Assessment & Plan:  Patient's (Body mass index is 41.94 kg/m².) indicates that they are morbidly/severely obese (BMI > 40 or > 35 with obesity - related health condition) with health conditions that include hypertension and dyslipidemias . Weight is improving with treatment. BMI  is above average; BMI management plan is completed. We discussed portion control and increasing exercise.  Patient states that he has been walking and exercising.  He has lost about 7 pounds since his last office visit.  He remains on Mounjaro 7.5 mg weekly.  We will go ahead and bump him up to 10 mg weekly.  Recommend continuing weight loss and exercise.  May titrate dosing monthly if tolerating well.  Follow-up in 3 months.    Orders:  -     Comprehensive Metabolic Panel; Future  -     Lipid Panel; Future    3. Benign essential hypertension  Assessment & Plan:  Blood pressure remains elevated.  His blood pressure has been running 140s over 90s at home.  Will add in losartan 25 mg daily in addition to his hydrochlorothiazide 12.5 mg daily.  Patient agreeable with plan.  Recommend continuing to monitor blood pressure at home and report elevated readings.  He acknowledges understanding.      Orders:  -     Comprehensive Metabolic Panel; Future  -     Lipid Panel; Future    4. Mixed hyperlipidemia  Assessment & Plan:  He remains on lipitor 10 mg qhs and [facility]ofibrate 145 [facility]. No recent labs.  Triglycerides are elevated, LDL not quite to goal. He is losing weight and exercising.   Liver functions are improving.  Will recheck lipids in 3 months to see if there is any improvement in lipids after continuous weight loss and exercise      Other orders  -     losartan (Cozaar) 25 MG tablet; Take 1 tablet by mouth Daily.  Dispense: 30 tablet; Refill: 2  -     Tirzepatide-Weight Management (ZEPBOUND) 10 MG/0.5ML solution  auto-injector; Inject 0.5 mL under the skin into the appropriate area as directed 1 (One) Time Per Week.  Dispense: 2 mL; Refill: 0         Tobacco Use: High Risk (11/4/2024)    Patient History     Smoking Tobacco Use: Former     Smokeless Tobacco Use: Current     Passive Exposure: Not on file       Follow Up     Return in about 3 months (around 2/4/2025) for Recheck.    Please note that portions of this note were completed with a voice recognition program.    Patient was given instructions and counseling regarding his condition or for health maintenance advice. Please see specific information pulled into the AVS if appropriate.   I have reviewed information obtained and documented by others and I have confirmed the accuracy of this documented note.    KEVIN Sanchez

## 2024-11-04 ENCOUNTER — OFFICE VISIT (OUTPATIENT)
Dept: INTERNAL MEDICINE | Age: 49
End: 2024-11-04
Payer: OTHER GOVERNMENT

## 2024-11-04 VITALS
OXYGEN SATURATION: 98 % | SYSTOLIC BLOOD PRESSURE: 148 MMHG | BODY MASS INDEX: 38.36 KG/M2 | RESPIRATION RATE: 18 BRPM | DIASTOLIC BLOOD PRESSURE: 90 MMHG | TEMPERATURE: 97.8 F | HEART RATE: 66 BPM | WEIGHT: 315 LBS | HEIGHT: 76 IN

## 2024-11-04 DIAGNOSIS — E66.01 CLASS 3 SEVERE OBESITY DUE TO EXCESS CALORIES WITH SERIOUS COMORBIDITY AND BODY MASS INDEX (BMI) OF 40.0 TO 44.9 IN ADULT: ICD-10-CM

## 2024-11-04 DIAGNOSIS — E66.813 CLASS 3 SEVERE OBESITY DUE TO EXCESS CALORIES WITH SERIOUS COMORBIDITY AND BODY MASS INDEX (BMI) OF 40.0 TO 44.9 IN ADULT: ICD-10-CM

## 2024-11-04 DIAGNOSIS — Z00.00 ANNUAL PHYSICAL EXAM: Primary | ICD-10-CM

## 2024-11-04 DIAGNOSIS — E78.2 MIXED HYPERLIPIDEMIA: ICD-10-CM

## 2024-11-04 DIAGNOSIS — I10 BENIGN ESSENTIAL HYPERTENSION: ICD-10-CM

## 2024-11-04 PROCEDURE — 99396 PREV VISIT EST AGE 40-64: CPT

## 2024-11-04 RX ORDER — LOSARTAN POTASSIUM 25 MG/1
25 TABLET ORAL DAILY
Qty: 30 TABLET | Refills: 2 | Status: SHIPPED | OUTPATIENT
Start: 2024-11-04 | End: 2024-11-04 | Stop reason: SDUPTHER

## 2024-11-04 RX ORDER — LOSARTAN POTASSIUM 25 MG/1
25 TABLET ORAL DAILY
Qty: 30 TABLET | Refills: 2 | Status: SHIPPED | OUTPATIENT
Start: 2024-11-04

## 2024-11-04 NOTE — ASSESSMENT & PLAN NOTE
He remains on lipitor 10 mg qhs and fenofibrate 145 mg daily [facility]. No recent labs.  Triglycerides are elevated, LDL not quite to goal. He is losing weight and exercising.   Liver functions are improving.  Will recheck lipids in 3 months to see if there is any improvement in lipids after continuous weight loss and exercise

## 2024-11-04 NOTE — ASSESSMENT & PLAN NOTE
Colon cancer screening-pending   Tobacco use-denies smoking, does have history of smoking quit several years ago/admits to chewing tobacco  Alcohol use-seldom  Recommend regular dental and eye exams.  He declines flu and COVID-vaccine.  He denies any family history of prostate or colon cancer.  He also discussed healthy diet and regular exercise.

## 2024-11-04 NOTE — TELEPHONE ENCOUNTER
Patient is requesting prescription be sent to [facility] pharmacy.  Okay to switch pharmacies.  Thanks

## 2024-11-04 NOTE — ASSESSMENT & PLAN NOTE
Patient's (Body mass index is 41.94 kg/m².) indicates that they are morbidly/severely obese (BMI > 40 or > 35 with obesity - related health condition) with health conditions that include hypertension and dyslipidemias . Weight is improving with treatment. BMI  is above average; BMI management plan is completed. We discussed portion control and increasing exercise.  Patient states that he has been walking and exercising.  He has lost about 7 pounds since his last office visit.  He remains on Mounjaro 7.5 mg weekly.  We will go ahead and bump him up to 10 mg weekly.  Recommend continuing weight loss and exercise.  May titrate dosing monthly if tolerating well.  Follow-up in 3 months.

## 2024-11-04 NOTE — ASSESSMENT & PLAN NOTE
Blood pressure remains elevated.  His blood pressure has been running 140s over 90s at home.  Will add in losartan 25 mg daily in addition to his hydrochlorothiazide 12.5 mg daily.  Patient agreeable with plan.  Recommend continuing to monitor blood pressure at home and report elevated readings.  He acknowledges understanding.

## 2024-11-21 RX ORDER — CHLORAL HYDRATE 500 MG
1000 CAPSULE ORAL
Qty: 90 CAPSULE | Refills: 1 | Status: SHIPPED | OUTPATIENT
Start: 2024-11-21

## 2024-11-21 RX ORDER — HYDROCHLOROTHIAZIDE 12.5 MG/1
12.5 TABLET ORAL DAILY
Qty: 30 TABLET | Refills: 5 | Status: SHIPPED | OUTPATIENT
Start: 2024-11-21

## 2024-11-21 RX ORDER — GLUCOSAMINE SULFATE 500 MG
1 CAPSULE ORAL 3 TIMES DAILY
Qty: 90 CAPSULE | Refills: 3 | Status: SHIPPED | OUTPATIENT
Start: 2024-11-21

## 2024-11-25 ENCOUNTER — TELEPHONE (OUTPATIENT)
Dept: INTERNAL MEDICINE | Age: 49
End: 2024-11-25

## 2024-11-25 NOTE — TELEPHONE ENCOUNTER
Caller: Anshul Luna    Relationship: Self    Best call back number: [phone]       Additional information or concerns: REPORTS THAT THE Tirzepatide-Weight Management (ZEPBOUND) 10 MG/0.5ML solution auto-injector   NEEDS A PRIOR AUTHORIZATION    PLEASE ADVISE   AND PLEASE LET PATIENT KNOW WHEN IT HAS BEEN COMPLETED.

## 2024-12-27 ENCOUNTER — PATIENT MESSAGE (OUTPATIENT)
Dept: INTERNAL MEDICINE | Age: 49
End: 2024-12-27
Payer: OTHER GOVERNMENT

## 2025-02-10 ENCOUNTER — OFFICE VISIT (OUTPATIENT)
Dept: INTERNAL MEDICINE | Age: 50
End: 2025-02-10
Payer: OTHER GOVERNMENT

## 2025-02-10 VITALS
TEMPERATURE: 97.8 F | HEIGHT: 76 IN | DIASTOLIC BLOOD PRESSURE: 82 MMHG | SYSTOLIC BLOOD PRESSURE: 118 MMHG | OXYGEN SATURATION: 98 % | WEIGHT: 315 LBS | RESPIRATION RATE: 18 BRPM | BODY MASS INDEX: 38.36 KG/M2 | HEART RATE: 74 BPM

## 2025-02-10 DIAGNOSIS — F41.9 ANXIETY AND DEPRESSION: ICD-10-CM

## 2025-02-10 DIAGNOSIS — E78.2 MIXED HYPERLIPIDEMIA: ICD-10-CM

## 2025-02-10 DIAGNOSIS — E66.813 CLASS 3 SEVERE OBESITY DUE TO EXCESS CALORIES WITH SERIOUS COMORBIDITY AND BODY MASS INDEX (BMI) OF 40.0 TO 44.9 IN ADULT: ICD-10-CM

## 2025-02-10 DIAGNOSIS — F32.A ANXIETY AND DEPRESSION: ICD-10-CM

## 2025-02-10 DIAGNOSIS — I10 BENIGN ESSENTIAL HYPERTENSION: Primary | ICD-10-CM

## 2025-02-10 DIAGNOSIS — E66.01 CLASS 3 SEVERE OBESITY DUE TO EXCESS CALORIES WITH SERIOUS COMORBIDITY AND BODY MASS INDEX (BMI) OF 40.0 TO 44.9 IN ADULT: ICD-10-CM

## 2025-02-10 DIAGNOSIS — F43.12 CHRONIC POST-TRAUMATIC STRESS DISORDER: ICD-10-CM

## 2025-02-10 PROCEDURE — 99214 OFFICE O/P EST MOD 30 MIN: CPT

## 2025-02-10 RX ORDER — LOSARTAN POTASSIUM 25 MG/1
25 TABLET ORAL DAILY
Qty: 90 TABLET | Refills: 1 | Status: CANCELLED | OUTPATIENT
Start: 2025-02-10

## 2025-02-10 NOTE — ASSESSMENT & PLAN NOTE
Remains on Lipitor 10 mg nightly and fenofibrate 145 mg daily [facility].    A lipid panel and CMP have been ordered to assess his cholesterol levels and overall metabolic function.   He is advised to continue taking omega-3 supplements to aid in cholesterol management.  Continue healthy diet regular exercise.

## 2025-02-10 NOTE — ASSESSMENT & PLAN NOTE
Patient's (Body mass index is 42.92 kg/m².) indicates that they are obese (BMI >30) with health conditions that include obstructive sleep apnea, hypertension, and dyslipidemias . Weight is worsening. BMI  is above average; no BMI management plan is appropriate. We discussed portion control, increasing exercise, and pharmacologic options including Zepbound in which we are waiting insurance approval .

## 2025-02-13 RX ORDER — SEMAGLUTIDE 1 MG/.5ML
1 INJECTION, SOLUTION SUBCUTANEOUS WEEKLY
COMMUNITY
End: 2025-02-13 | Stop reason: SDUPTHER

## 2025-02-13 RX ORDER — SEMAGLUTIDE 1 MG/.5ML
1 INJECTION, SOLUTION SUBCUTANEOUS WEEKLY
Qty: 2 ML | Refills: 0 | Status: SHIPPED | OUTPATIENT
Start: 2025-02-13

## 2025-02-13 NOTE — TELEPHONE ENCOUNTER
Rx Refill Note  Requested Prescriptions      No prescriptions requested or ordered in this encounter      Last office visit with prescribing clinician: 2/10/2025   Last telemedicine visit with prescribing clinician: Visit date not found   Next office visit with prescribing clinician: Visit date not found                         Would you like a call back once the refill request has been completed: [] Yes [] No    If the office needs to give you a call back, can they leave a voicemail: [] Yes [] No    Casandra Andrade MA  02/13/25, 13:59 EST

## 2025-02-19 ENCOUNTER — DOCUMENTATION (OUTPATIENT)
Dept: INTERNAL MEDICINE | Age: 50
End: 2025-02-19
Payer: OTHER GOVERNMENT

## 2025-04-14 ENCOUNTER — PREP FOR SURGERY (OUTPATIENT)
Dept: OTHER | Facility: HOSPITAL | Age: 50
End: 2025-04-14
Payer: OTHER GOVERNMENT

## 2025-04-14 ENCOUNTER — OFFICE VISIT (OUTPATIENT)
Dept: SURGERY | Facility: CLINIC | Age: 50
End: 2025-04-14
Payer: OTHER GOVERNMENT

## 2025-04-14 VITALS
HEART RATE: 76 BPM | DIASTOLIC BLOOD PRESSURE: 97 MMHG | SYSTOLIC BLOOD PRESSURE: 141 MMHG | WEIGHT: 315 LBS | OXYGEN SATURATION: 97 % | HEIGHT: 76 IN | BODY MASS INDEX: 38.36 KG/M2

## 2025-04-14 DIAGNOSIS — Z12.11 SCREENING FOR MALIGNANT NEOPLASM OF COLON: Primary | ICD-10-CM

## 2025-04-14 RX ORDER — POLYETHYLENE GLYCOL 3350 17 G/17G
POWDER, FOR SOLUTION ORAL
Qty: 238 G | Refills: 0 | Status: SHIPPED | OUTPATIENT
Start: 2025-04-14

## 2025-04-14 RX ORDER — SODIUM CHLORIDE 9 MG/ML
40 INJECTION, SOLUTION INTRAVENOUS AS NEEDED
OUTPATIENT
Start: 2025-04-14

## 2025-04-14 RX ORDER — SODIUM CHLORIDE 0.9 % (FLUSH) 0.9 %
10 SYRINGE (ML) INJECTION AS NEEDED
OUTPATIENT
Start: 2025-04-14

## 2025-04-14 RX ORDER — SODIUM CHLORIDE 0.9 % (FLUSH) 0.9 %
3 SYRINGE (ML) INJECTION EVERY 12 HOURS SCHEDULED
OUTPATIENT
Start: 2025-04-14

## 2025-04-14 NOTE — PROGRESS NOTES
Chief Complaint: Colonoscopy    Subjective      Colonoscopy consultation       History of Present Illness  Anshul Luna is a 49 y.o. male presents to [facility] for colonoscopy consultation.    Patient presents today on referral from Lizeth Zavaleta for colonoscopy consultation.  Patient denies any abdominal pain bowel habit, or rectal bleeding.  Denies any family history of colorectal cancer.  No previous colonoscopy.    Admits to MARIN.  Does use a CPAP.    Denies any cardiac issues.    Admits to using Wegovy.    Objective     Past Medical History:   Diagnosis Date    Allergic     Seasonal    Ankle sprain     Anxiety     Arthritis     Arthritis of neck     Asthma 2007    From burn pits    Cervical disc disorder     Cholelithiasis 2009    Gallbladder removed    COPD (chronic obstructive pulmonary disease) 2007    Birn pits    Depression     Dislocation of finger     Fracture of wrist     Fracture, finger     Fracture, foot     Headache     HL (hearing loss)      related    Hyperlipidemia     Hypertension     Injury of back     Knee sprain     Knee swelling     Low back pain     5 buldging disks    Low back strain     Lumbosacral disc disease     Neck strain     Rotator cuff syndrome     Thoracic disc disorder     Wrist sprain        Past Surgical History:   Procedure Laterality Date    ANKLE SURGERY Right 2009    CHOLECYSTECTOMY  2009    FOOT SURGERY      FRACTURE SURGERY  2013    Bennets fracture left thumb    HAND SURGERY      HERNIA REPAIR  2000    Abdoman    ORIF FINGER / THUMB FRACTURE Left 2014    VASECTOMY  2010       Outpatient Medications Marked as Taking for the 4/14/25 encounter (Office Visit) with Montez April, APRN   Medication Sig Dispense Refill    atorvastatin (LIPITOR) 10 MG tablet Take 1 tablet by mouth Daily.      cetirizine (zyrTEC) 10 MG tablet Take 1 tablet by mouth Daily.      fenofibrate (TRICOR) 145 MG tablet Take 1 tablet by mouth Daily.       Fluticasone-Salmeterol (ADVAIR/WIXELA) 100-50 MCG/ACT DISKUS Inhale 1 puff 2 (Two) Times a Day.      gabapentin (NEURONTIN) 300 MG capsule Take 1 capsule by mouth 4 (Four) Times a Day.      Glucosamine 500 MG capsule Take 1 capsule by mouth 3 (Three) Times a Day. 90 capsule 3    hydroCHLOROthiazide 12.5 MG tablet Take 1 tablet by mouth Daily. 30 tablet 5    ibuprofen (ADVIL,MOTRIN) 800 MG tablet Take 1 tablet by mouth.      losartan (Cozaar) 25 MG tablet Take 1 tablet by mouth Daily. 30 tablet 2    Omega-3 Fatty Acids (fish oil) 1000 MG capsule capsule Take 1 capsule by mouth Daily With Breakfast. 90 capsule 1    rizatriptan (MAXALT) 10 MG tablet Take 1 tablet by mouth.      Semaglutide-Weight Management (Wegovy) 1 MG/0.5ML solution auto-injector Inject 0.5 mL under the skin into the appropriate area as directed 1 (One) Time Per Week. 2 mL 0    sertraline (ZOLOFT) 50 MG tablet Take 1 tablet by mouth Daily.         Allergies   Allergen Reactions    Bacitracin-Polymyxin B Hives    Miconazole Unknown - Low Severity     Hives,swelling, trouble breathing,bumps in his throat    Simvastatin Unknown - Low Severity     Fatigue, severe joint pain    Chlorhexidine Gluconate Rash    Iodine Rash        Family History   Problem Relation Age of Onset    Cancer Mother     Hypertension Father     Cancer Maternal Grandfather     Cancer Maternal Grandmother     Osteoporosis Maternal Grandmother     Cancer Paternal Grandmother     Cancer Maternal Aunt        Social History     Socioeconomic History    Marital status:    Tobacco Use    Smoking status: Former     Current packs/day: 0.00     Average packs/day: 1 pack/day for 3.0 years (3.0 ttl pk-yrs)     Types: Cigarettes     Start date:      Quit date: 2000     Years since quittin.3    Smokeless tobacco: Current     Types: Chew    Tobacco comments:     Dip Pouches    Vaping Use    Vaping status: Never Used   Substance and Sexual Activity    Alcohol use: Not  "Currently    Drug use: Never    Sexual activity: Yes     Partners: Female     Birth control/protection: None, Vasectomy       Review of Systems   Constitutional:  Negative for chills and fever.   Gastrointestinal:  Negative for abdominal distention, abdominal pain, anal bleeding, blood in stool, constipation, diarrhea and rectal pain.        Vital Signs:   /97 (BP Location: Left arm, Patient Position: Sitting, Cuff Size: Large Adult)   Pulse 76   Ht 193 cm (75.98\")   Wt (!) 159 kg (350 lb 12 oz)   SpO2 97%   BMI 42.72 kg/m²      Physical Exam  Vitals and nursing note reviewed.   Constitutional:       General: He is not in acute distress.     Appearance: Normal appearance. He is not ill-appearing.   HENT:      Head: Normocephalic and atraumatic.   Cardiovascular:      Rate and Rhythm: Normal rate.   Pulmonary:      Effort: Pulmonary effort is normal.      Breath sounds: No stridor.   Abdominal:      Palpations: Abdomen is soft.      Tenderness: There is no guarding.   Musculoskeletal:         General: No deformity. Normal range of motion.   Skin:     General: Skin is warm and dry.      Coloration: Skin is not jaundiced.   Neurological:      General: No focal deficit present.      Mental Status: He is alert and oriented to person, place, and time.   Psychiatric:         Mood and Affect: Mood normal.         Thought Content: Thought content normal.          Result Review :          []  Laboratory  []  Radiology  []  Pathology  []  Microbiology  []  EKG/Telemetry   []  Cardiology/Vascular   []  Old records  I spent 15 minutes caring for Anshul on this date of service. This time includes time spent by me in the following activities: reviewing tests, obtaining and/or reviewing a separately obtained history, performing a medically appropriate examination and/or evaluation, ordering medications, tests, or procedures, and documenting information in the medical record        Assessment and Plan    Diagnoses and all " orders for this visit:    1. Screening for malignant neoplasm of colon (Primary)    Other orders  -     polyethylene glycol (MIRALAX) 17 g packet; Take as directed.  Instructions given in office.  Dispense: 238 g bottle  Dispense: 238 packet; Refill: 0    Hold Wegovy starting 4/28/25.      Follow Up   Return for Schedule colonoscopy with Dr. Neff on 4/30/2025 [facility], [place].    Hospital arrival time: 10:00    Possible risks/complications, benefits, and alternatives to surgical or invasive procedures have been explained to patient and/or legal guardian.    Patient has been evaluated and can tolerate anesthesia and/or sedation. Risks, benefits, and alternatives to anesthesia and sedation have been explained to the patient and/or legal guardian. Patient verbalizes understanding and is willing to proceed with the above plan.     Patient was given instructions and counseling regarding his condition or for health maintenance advice. Please see specific information pulled into the AVS if appropriate.     As always, it has been a pleasure to participate in your patient's care. Please call with questions or concerns.

## 2025-04-14 NOTE — H&P (VIEW-ONLY)
Chief Complaint: Colonoscopy    Subjective      Colonoscopy consultation       History of Present Illness  Anshul Luna is a 49 y.o. male presents to [facility] for colonoscopy consultation.    Patient presents today on referral from Lizeth Zavaleta for colonoscopy consultation.  Patient denies any abdominal pain bowel habit, or rectal bleeding.  Denies any family history of colorectal cancer.  No previous colonoscopy.    Admits to MARIN.  Does use a CPAP.    Denies any cardiac issues.    Admits to using Wegovy.    Objective     Past Medical History:   Diagnosis Date    Allergic     Seasonal    Ankle sprain     Anxiety     Arthritis     Arthritis of neck     Asthma 2007    From burn pits    Cervical disc disorder     Cholelithiasis 2009    Gallbladder removed    COPD (chronic obstructive pulmonary disease) 2007    Birn pits    Depression     Dislocation of finger     Fracture of wrist     Fracture, finger     Fracture, foot     Headache     HL (hearing loss)      related    Hyperlipidemia     Hypertension     Injury of back     Knee sprain     Knee swelling     Low back pain     5 buldging disks    Low back strain     Lumbosacral disc disease     Neck strain     Rotator cuff syndrome     Thoracic disc disorder     Wrist sprain        Past Surgical History:   Procedure Laterality Date    ANKLE SURGERY Right 2009    CHOLECYSTECTOMY  2009    FOOT SURGERY      FRACTURE SURGERY  2013    Bennets fracture left thumb    HAND SURGERY      HERNIA REPAIR  2000    Abdoman    ORIF FINGER / THUMB FRACTURE Left 2014    VASECTOMY  2010       Outpatient Medications Marked as Taking for the 4/14/25 encounter (Office Visit) with Montez April, APRN   Medication Sig Dispense Refill    atorvastatin (LIPITOR) 10 MG tablet Take 1 tablet by mouth Daily.      cetirizine (zyrTEC) 10 MG tablet Take 1 tablet by mouth Daily.      fenofibrate (TRICOR) 145 MG tablet Take 1 tablet by mouth Daily.       Fluticasone-Salmeterol (ADVAIR/WIXELA) 100-50 MCG/ACT DISKUS Inhale 1 puff 2 (Two) Times a Day.      gabapentin (NEURONTIN) 300 MG capsule Take 1 capsule by mouth 4 (Four) Times a Day.      Glucosamine 500 MG capsule Take 1 capsule by mouth 3 (Three) Times a Day. 90 capsule 3    hydroCHLOROthiazide 12.5 MG tablet Take 1 tablet by mouth Daily. 30 tablet 5    ibuprofen (ADVIL,MOTRIN) 800 MG tablet Take 1 tablet by mouth.      losartan (Cozaar) 25 MG tablet Take 1 tablet by mouth Daily. 30 tablet 2    Omega-3 Fatty Acids (fish oil) 1000 MG capsule capsule Take 1 capsule by mouth Daily With Breakfast. 90 capsule 1    rizatriptan (MAXALT) 10 MG tablet Take 1 tablet by mouth.      Semaglutide-Weight Management (Wegovy) 1 MG/0.5ML solution auto-injector Inject 0.5 mL under the skin into the appropriate area as directed 1 (One) Time Per Week. 2 mL 0    sertraline (ZOLOFT) 50 MG tablet Take 1 tablet by mouth Daily.         Allergies   Allergen Reactions    Bacitracin-Polymyxin B Hives    Miconazole Unknown - Low Severity     Hives,swelling, trouble breathing,bumps in his throat    Simvastatin Unknown - Low Severity     Fatigue, severe joint pain    Chlorhexidine Gluconate Rash    Iodine Rash        Family History   Problem Relation Age of Onset    Cancer Mother     Hypertension Father     Cancer Maternal Grandfather     Cancer Maternal Grandmother     Osteoporosis Maternal Grandmother     Cancer Paternal Grandmother     Cancer Maternal Aunt        Social History     Socioeconomic History    Marital status:    Tobacco Use    Smoking status: Former     Current packs/day: 0.00     Average packs/day: 1 pack/day for 3.0 years (3.0 ttl pk-yrs)     Types: Cigarettes     Start date:      Quit date: 2000     Years since quittin.3    Smokeless tobacco: Current     Types: Chew    Tobacco comments:     Dip Pouches    Vaping Use    Vaping status: Never Used   Substance and Sexual Activity    Alcohol use: Not  "Currently    Drug use: Never    Sexual activity: Yes     Partners: Female     Birth control/protection: None, Vasectomy       Review of Systems   Constitutional:  Negative for chills and fever.   Gastrointestinal:  Negative for abdominal distention, abdominal pain, anal bleeding, blood in stool, constipation, diarrhea and rectal pain.        Vital Signs:   /97 (BP Location: Left arm, Patient Position: Sitting, Cuff Size: Large Adult)   Pulse 76   Ht 193 cm (75.98\")   Wt (!) 159 kg (350 lb 12 oz)   SpO2 97%   BMI 42.72 kg/m²      Physical Exam  Vitals and nursing note reviewed.   Constitutional:       General: He is not in acute distress.     Appearance: Normal appearance. He is not ill-appearing.   HENT:      Head: Normocephalic and atraumatic.   Cardiovascular:      Rate and Rhythm: Normal rate.   Pulmonary:      Effort: Pulmonary effort is normal.      Breath sounds: No stridor.   Abdominal:      Palpations: Abdomen is soft.      Tenderness: There is no guarding.   Musculoskeletal:         General: No deformity. Normal range of motion.   Skin:     General: Skin is warm and dry.      Coloration: Skin is not jaundiced.   Neurological:      General: No focal deficit present.      Mental Status: He is alert and oriented to person, place, and time.   Psychiatric:         Mood and Affect: Mood normal.         Thought Content: Thought content normal.          Result Review :          []  Laboratory  []  Radiology  []  Pathology  []  Microbiology  []  EKG/Telemetry   []  Cardiology/Vascular   []  Old records  I spent 15 minutes caring for Anshul on this date of service. This time includes time spent by me in the following activities: reviewing tests, obtaining and/or reviewing a separately obtained history, performing a medically appropriate examination and/or evaluation, ordering medications, tests, or procedures, and documenting information in the medical record        Assessment and Plan    Diagnoses and all " orders for this visit:    1. Screening for malignant neoplasm of colon (Primary)    Other orders  -     polyethylene glycol (MIRALAX) 17 g packet; Take as directed.  Instructions given in office.  Dispense: 238 g bottle  Dispense: 238 packet; Refill: 0    Hold Wegovy starting 4/28/25.      Follow Up   Return for Schedule colonoscopy with Dr. Neff on 4/30/2025 [facility].    Hospital arrival time: 10:00    Possible risks/complications, benefits, and alternatives to surgical or invasive procedures have been explained to patient and/or legal guardian.    Patient has been evaluated and can tolerate anesthesia and/or sedation. Risks, benefits, and alternatives to anesthesia and sedation have been explained to the patient and/or legal guardian. Patient verbalizes understanding and is willing to proceed with the above plan.     Patient was given instructions and counseling regarding his condition or for health maintenance advice. Please see specific information pulled into the AVS if appropriate.     As always, it has been a pleasure to participate in your patient's care. Please call with questions or concerns.

## 2025-04-15 DIAGNOSIS — E66.813 CLASS 3 SEVERE OBESITY DUE TO EXCESS CALORIES WITH SERIOUS COMORBIDITY AND BODY MASS INDEX (BMI) OF 40.0 TO 44.9 IN ADULT: Primary | ICD-10-CM

## 2025-04-15 DIAGNOSIS — E66.01 CLASS 3 SEVERE OBESITY DUE TO EXCESS CALORIES WITH SERIOUS COMORBIDITY AND BODY MASS INDEX (BMI) OF 40.0 TO 44.9 IN ADULT: Primary | ICD-10-CM

## 2025-04-15 RX ORDER — SEMAGLUTIDE 1 MG/.5ML
1 INJECTION, SOLUTION SUBCUTANEOUS WEEKLY
Qty: 2 ML | Refills: 0 | Status: SHIPPED | OUTPATIENT
Start: 2025-04-15

## 2025-04-18 ENCOUNTER — PATIENT ROUNDING (BHMG ONLY) (OUTPATIENT)
Dept: SURGERY | Facility: CLINIC | Age: 50
End: 2025-04-18
Payer: OTHER GOVERNMENT

## 2025-04-18 NOTE — PROGRESS NOTES
Hello, my name is Glenroy. I am with [facility], [address], [address], [place] [address]. In an effort to hear the opinions of our patients, I would like to ask you a few questions about your visit with our office.     Can you tell me about your visit with us? What things went well?    We're always looking for ways to make our patients' experiences even better. Do you have any recommendations on ways we may improve?    Overall, were you satisfied with your first visit to our practice?    Is there a particular staff member/s who you would like to recognize for doing a great job?      I appreciate you taking the time to answer these questions. The providers and staff here in our office want you to get the healthcare you need and deserve and we look forward to your comments.     Thank you for your input and have a wonderful day!

## 2025-04-29 ENCOUNTER — ANESTHESIA EVENT (OUTPATIENT)
Dept: GASTROENTEROLOGY | Facility: HOSPITAL | Age: 50
End: 2025-04-29
Payer: OTHER GOVERNMENT

## 2025-04-29 RX ORDER — SODIUM CHLORIDE, SODIUM LACTATE, POTASSIUM CHLORIDE, CALCIUM CHLORIDE 600; 310; 30; 20 MG/100ML; MG/100ML; MG/100ML; MG/100ML
30 INJECTION, SOLUTION INTRAVENOUS CONTINUOUS
Status: CANCELLED | OUTPATIENT
Start: 2025-04-29 | End: 2025-04-30

## 2025-04-29 NOTE — ANESTHESIA PREPROCEDURE EVALUATION
Anesthesia Evaluation     Patient summary reviewed   NPO Solid Status: > 8 hours  NPO Liquid Status: > 4 hours           Airway   Mallampati: I  Neck ROM: full  No difficulty expected and Large neck circumference  Dental - normal exam     Pulmonary - normal exam    breath sounds clear to auscultation  (+) a smoker Former, cigarettes, COPD ([place]) mild, asthma,sleep apnea on CPAP  Cardiovascular - normal exam    ECG reviewed  Rhythm: regular  Rate: normal    (+) hypertension well controlled less than 2 medications, hyperlipidemia      Neuro/Psych  (+) headaches, psychiatric history Anxiety and Depression  GI/Hepatic/Renal/Endo    (+) obesity, morbid obesity    Musculoskeletal     Abdominal  - normal exam   Substance History      OB/GYN          Other   arthritis,     ROS/Med Hx Other: Scanned EKG  NSR.    Last dose Wegovy, 2 weeks ago.                     Anesthesia Plan    ASA 3     general   total IV anesthesia  (Total IV Anesthesia    Patient understands anesthesia not responsible for dental damage.  )  intravenous induction     Anesthetic plan, risks, benefits, and alternatives have been provided, discussed and informed consent has been obtained with: patient and spouse/significant other.  Pre-procedure education provided  Use of blood products discussed with patient and spouse/significant other  Consented to blood products.    Plan discussed with CRNA.        CODE STATUS:

## 2025-04-30 ENCOUNTER — ANESTHESIA (OUTPATIENT)
Dept: GASTROENTEROLOGY | Facility: HOSPITAL | Age: 50
End: 2025-04-30
Payer: OTHER GOVERNMENT

## 2025-04-30 ENCOUNTER — HOSPITAL ENCOUNTER (OUTPATIENT)
Facility: HOSPITAL | Age: 50
Setting detail: HOSPITAL OUTPATIENT SURGERY
Discharge: HOME | End: 2025-04-30
Attending: SURGERY | Admitting: SURGERY
Payer: OTHER GOVERNMENT

## 2025-04-30 VITALS
BODY MASS INDEX: 38.36 KG/M2 | RESPIRATION RATE: 20 BRPM | HEIGHT: 76 IN | HEART RATE: 73 BPM | DIASTOLIC BLOOD PRESSURE: 69 MMHG | WEIGHT: 315 LBS | TEMPERATURE: 97 F | OXYGEN SATURATION: 98 % | SYSTOLIC BLOOD PRESSURE: 110 MMHG

## 2025-04-30 DIAGNOSIS — Z12.11 SCREENING FOR MALIGNANT NEOPLASM OF COLON: ICD-10-CM

## 2025-04-30 PROCEDURE — 25010000002 LIDOCAINE PF 2% 2 % SOLUTION

## 2025-04-30 PROCEDURE — 25010000002 PROPOFOL 10 MG/ML EMULSION

## 2025-04-30 PROCEDURE — 25810000003 LACTATED RINGERS PER 1000 ML

## 2025-04-30 PROCEDURE — 88305 TISSUE EXAM BY PATHOLOGIST: CPT | Performed by: SURGERY

## 2025-04-30 RX ORDER — LIDOCAINE HYDROCHLORIDE 20 MG/ML
INJECTION, SOLUTION EPIDURAL; INFILTRATION; INTRACAUDAL; PERINEURAL AS NEEDED
Status: DISCONTINUED | OUTPATIENT
Start: 2025-04-30 | End: 2025-04-30 | Stop reason: SURG

## 2025-04-30 RX ORDER — PROPOFOL 10 MG/ML
VIAL (ML) INTRAVENOUS AS NEEDED
Status: DISCONTINUED | OUTPATIENT
Start: 2025-04-30 | End: 2025-04-30 | Stop reason: SURG

## 2025-04-30 RX ORDER — SODIUM CHLORIDE, SODIUM LACTATE, POTASSIUM CHLORIDE, CALCIUM CHLORIDE 600; 310; 30; 20 MG/100ML; MG/100ML; MG/100ML; MG/100ML
INJECTION, SOLUTION INTRAVENOUS CONTINUOUS PRN
Status: DISCONTINUED | OUTPATIENT
Start: 2025-04-30 | End: 2025-04-30 | Stop reason: SURG

## 2025-04-30 RX ADMIN — PROPOFOL 150 MG: 10 INJECTION, EMULSION INTRAVENOUS at 12:03

## 2025-04-30 RX ADMIN — LIDOCAINE HYDROCHLORIDE 50 MG: 20 INJECTION, SOLUTION INTRAVENOUS at 12:03

## 2025-04-30 RX ADMIN — PROPOFOL 200 MCG/KG/MIN: 10 INJECTION, EMULSION INTRAVENOUS at 12:03

## 2025-04-30 RX ADMIN — SODIUM CHLORIDE, POTASSIUM CHLORIDE, SODIUM LACTATE AND CALCIUM CHLORIDE: 600; 310; 30; 20 INJECTION, SOLUTION INTRAVENOUS at 12:03

## 2025-04-30 NOTE — ANESTHESIA POSTPROCEDURE EVALUATION
Patient: Anshul Luna    Procedure Summary       Date: 04/30/25 Room / Location: [facility] ENDOSCOPY 1 / [facility]COPY    Anesthesia Start: 1158 Anesthesia Stop: 1222    Procedure: COLONOSCOPY with cold snare polypectomy Diagnosis:       Screening for malignant neoplasm of colon      (Screening for malignant neoplasm of colon [Z12.11])    Surgeons: Bogdan Neff MD Provider: Juni Narayan CRNA    Anesthesia Type: general ASA Status: 3            Anesthesia Type: general    Vitals  Vitals Value Taken Time   /69 04/30/25 12:36   Temp 36.1 °C (97 °F) 04/30/25 12:22   Pulse 87 04/30/25 12:38   Resp 20 04/30/25 12:35   SpO2 97 % 04/30/25 12:38   Vitals shown include unfiled device data.        Post Anesthesia Care and Evaluation    Post-procedure mental status: acceptable.  Pain management: satisfactory to patient    Airway patency: patent  Anesthetic complications: No anesthetic complications    Cardiovascular status: acceptable  Respiratory status: acceptable    Comments: Per chart review

## 2025-04-30 NOTE — INTERVAL H&P NOTE
H&P reviewed. The patient was examined and there are no changes to the H&P.         Received fax from [facility] re: possible interaction  Their records indicate this pt received the following prescriptions (rizatriptan and sertraline)  Use of triptans and serotonin reuptake inhibitors may increase the risk for serotonin syndrome  Symptoms may include htn, hyperthermia, myoclonus and mental status changes  The probable mechanism of this interaction is additive pharmacologic effects resulting in excessive serotomergic stimulation     Case#[id number]  Call back # [phone]    Chart reviewed: I don't see sertraline listed on pt's current med list      Called and left a message on pt's answering machine for a call back

## 2025-05-01 LAB
CYTO UR: NORMAL
LAB AP CASE REPORT: NORMAL
LAB AP CLINICAL INFORMATION: NORMAL
PATH REPORT.FINAL DX SPEC: NORMAL
PATH REPORT.GROSS SPEC: NORMAL

## 2025-05-13 NOTE — PROGRESS NOTES
Chief Complaint  Primary Care Follow-Up (49 year old male here today for a 3 month follow up. He is due for labs and has orders pended. He is not fasting today but states he will get them done tomorrow morning. /He is following up on starting Losartan from last visit. He states he has tolerated it well and believes it is working well based on how he feels. )    Primary Care Follow-Up    SUBJECTIVE  Anshul Luna presents to [facility]   History of Present Illness  The patient presents for a 3-month follow-up.    He has been monitoring his blood pressure at home, which has consistently remained within the normal range, averaging around 120/80. He reports no adverse reactions to losartan or hydrochlorothiazide.    He has been making dietary modifications for an extended period. He has gained approximately 10 pounds over the past 3 months, increasing his weight from 344 to 352 pounds. He acknowledges a decrease in physical activity.    He continues to take Zoloft for anxiety and depression, which he reports as effective.    He is currently on Lipitor and fenofibrate for cholesterol management. He also takes omega-3 supplements at home.    He uses a CPAP machine and requires a signed document from his physician to receive supplies. He reports no chest pain or shortness of breath. His use of inhalers is contingent on his activities and the season. He finds relief with Advair during this time of year.          Past Medical History:   Diagnosis Date    Anxiety     Arthritis     Depression     Headache     Hyperlipidemia       History reviewed. No pertinent family history.   Past Surgical History:   Procedure Laterality Date    ANKLE SURGERY Right 2009    CHOLECYSTECTOMY  2009    ORIF FINGER / THUMB FRACTURE Left 2014        Current Outpatient Medications:     albuterol sulfate  (90 Base) MCG/ACT inhaler, Inhale 2 puffs., Disp: , Rfl:     atorvastatin (LIPITOR) 10 MG  "tablet, Take 1 tablet by mouth Daily., Disp: , Rfl:     cetirizine (zyrTEC) 10 MG tablet, Take 1 tablet by mouth Daily., Disp: , Rfl:     fenofibrate (TRICOR) 145 MG tablet, Take 1 tablet by mouth Daily., Disp: , Rfl:     Fluticasone-Salmeterol (ADVAIR/WIXELA) 100-50 MCG/ACT DISKUS, Inhale 1 puff 2 (Two) Times a Day., Disp: , Rfl:     gabapentin (NEURONTIN) 300 MG capsule, Take 1 capsule by mouth 4 (Four) Times a Day., Disp: , Rfl:     Glucosamine 500 MG capsule, Take 1 capsule by mouth 3 (Three) Times a Day., Disp: 90 capsule, Rfl: 3    hydroCHLOROthiazide 12.5 MG tablet, Take 1 tablet by mouth Daily., Disp: 30 tablet, Rfl: 5    ibuprofen (ADVIL,MOTRIN) 800 MG tablet, Take 1 tablet by mouth., Disp: , Rfl:     losartan (Cozaar) 25 MG tablet, Take 1 tablet by mouth Daily., Disp: 30 tablet, Rfl: 2    Omega-3 Fatty Acids (fish oil) 1000 MG capsule capsule, Take 1 capsule by mouth Daily With Breakfast., Disp: 90 capsule, Rfl: 1    rizatriptan (MAXALT) 10 MG tablet, Take 1 tablet by mouth., Disp: , Rfl:     sertraline (ZOLOFT) 50 MG tablet, Take 1 tablet by mouth Daily., Disp: , Rfl:     Tirzepatide-Weight Management (ZEPBOUND) 10 MG/0.5ML solution auto-injector, Inject 0.5 mL under the skin into the appropriate area as directed 1 (One) Time Per Week., Disp: 2 mL, Rfl: 0    OBJECTIVE  Vital Signs:   /82 (BP Location: Left arm, Patient Position: Sitting)   Pulse 74   Temp 97.8 °F (36.6 °C) (Temporal)   Resp 18   Ht 193 cm (75.98\")   Wt (!) 160 kg (352 lb 6.4 oz)   SpO2 98%   BMI 42.92 kg/m²    Estimated body mass index is 42.92 kg/m² as calculated from the following:    Height as of this encounter: 193 cm (75.98\").    Weight as of this encounter: 160 kg (352 lb 6.4 oz).     Wt Readings from Last 3 Encounters:   02/10/25 (!) 160 kg (352 lb 6.4 oz)   11/04/24 (!) 156 kg (344 lb 6.4 oz)   09/23/24 (!) 159 kg (351 lb 9.6 oz)     BP Readings from Last 3 Encounters:   02/10/25 118/82   11/04/24 148/90   09/23/24 " 148/90       Physical Exam  Vitals and nursing note reviewed.   Constitutional:       Appearance: Normal appearance.   HENT:      Head: Normocephalic and atraumatic.   Eyes:      Extraocular Movements: Extraocular movements intact.      Conjunctiva/sclera: Conjunctivae normal.   Cardiovascular:      Rate and Rhythm: Normal rate and regular rhythm.      Heart sounds: Normal heart sounds.   Pulmonary:      Effort: Pulmonary effort is normal.      Breath sounds: Normal breath sounds.   Abdominal:      General: Abdomen is flat. Bowel sounds are normal. There is no distension.      Palpations: Abdomen is soft. There is no mass.      Tenderness: There is no abdominal tenderness. There is no right CVA tenderness, left CVA tenderness, guarding or rebound.      Hernia: No hernia is present.   Musculoskeletal:         General: Normal range of motion.      Cervical back: Normal range of motion and neck supple.      Right lower le+ Edema present.      Left lower le+ Edema present.   Skin:     General: Skin is warm and dry.   Neurological:      General: No focal deficit present.      Mental Status: He is alert and oriented to person, place, and time. Mental status is at baseline.   Psychiatric:         Mood and Affect: Mood normal.         Behavior: Behavior normal.         Thought Content: Thought content normal.         Judgment: Judgment normal.          Result Review        No Images in the past 120 days found..     The above data has been reviewed by KEVIN Sanchez 02/10/2025 09:32 EST.          Patient Care Team:  Lizeth Campoverde APRN as PCP - General (Internal Medicine)            ASSESSMENT & PLAN    Diagnoses and all orders for this visit:    1. Benign essential hypertension (Primary)  Assessment & Plan:  Blood pressure is well-controlled with losartan 25 mg daily.  Continue current regimen.      2. Mixed hyperlipidemia  Assessment & Plan:   Remains on Lipitor 10 mg nightly and fenofibrate 145 mg daily  [facility]    A lipid panel and CMP have been ordered to assess his cholesterol levels and overall metabolic function.   He is advised to continue taking omega-3 supplements to aid in cholesterol management.  Continue healthy diet regular exercise.      3. Class 3 severe obesity due to excess calories with serious comorbidity and body mass index (BMI) of 40.0 to 44.9 in adult  Assessment & Plan:  Patient's (Body mass index is 42.92 kg/m².) indicates that they are obese (BMI >30) with health conditions that include obstructive sleep apnea, hypertension, and dyslipidemias . Weight is worsening. BMI  is above average; no BMI management plan is appropriate. We discussed portion control, increasing exercise, and pharmacologic options including Zepbound in which we are waiting insurance approval .       4. Anxiety and depression  Assessment & Plan:  Stable with Zoloft 50 mg daily.  Continue current regimen      5. Chronic post-traumatic stress disorder  Assessment & Plan:   stable with Zoloft 50 mg daily continue current regimen.           Assessment & Plan  1. Hypertension.  His blood pressure readings have been consistently within the normal range, averaging around 120/80 mmHg. He is tolerating losartan and hydrochlorothiazide well. He will continue his current medication regimen.    2. Hyperlipidemia.  He is currently on Lipitor and fenofibrate for cholesterol management. A lipid panel and CMP have been ordered to assess his cholesterol levels and overall metabolic function. He is advised to continue taking omega-3 supplements to aid in cholesterol management.    3. Anxiety and depression.  He is doing well on Zoloft. No changes to his current treatment plan are necessary.    4. Obesity.  He has gained approximately 10 pounds since his last visit. He is advised to continue working on diet modifications and increase physical activity.    5. Sleep apnea.  He uses a CPAP machine and needs a form  signed by his physician to receive supplies. He will bring the form to the office for completion.    Follow-up  The patient will follow up in 6 months.      Tobacco Use: High Risk (2/10/2025)    Patient History     Smoking Tobacco Use: Former     Smokeless Tobacco Use: Current     Passive Exposure: Not on file       Follow Up     Return in about 6 months (around 8/10/2025).    Please note that portions of this note were completed with a voice recognition program.    Patient was given instructions and counseling regarding his condition or for health maintenance advice. Please see specific information pulled into the AVS if appropriate.   I have reviewed information obtained and documented by others and I have confirmed the accuracy of this documented note.    KEVIN Sanchez    Patient or patient representative verbalized consent for the use of Ambient Listening during the visit with  KEVIN Sanchez for chart documentation. 2/10/2025  09:41 EST     6 (moderate pain)

## 2025-07-03 DIAGNOSIS — E66.813 CLASS 3 SEVERE OBESITY DUE TO EXCESS CALORIES WITH SERIOUS COMORBIDITY AND BODY MASS INDEX (BMI) OF 40.0 TO 44.9 IN ADULT: ICD-10-CM

## 2025-07-03 RX ORDER — SEMAGLUTIDE 1 MG/.5ML
1 INJECTION, SOLUTION SUBCUTANEOUS WEEKLY
Qty: 2 ML | Refills: 0 | Status: SHIPPED | OUTPATIENT
Start: 2025-07-03

## 2025-08-14 DIAGNOSIS — E66.813 CLASS 3 SEVERE OBESITY DUE TO EXCESS CALORIES WITH SERIOUS COMORBIDITY AND BODY MASS INDEX (BMI) OF 40.0 TO 44.9 IN ADULT: ICD-10-CM

## 2025-08-14 RX ORDER — SEMAGLUTIDE 1 MG/.5ML
1 INJECTION, SOLUTION SUBCUTANEOUS WEEKLY
Qty: 2 ML | Refills: 0 | Status: SHIPPED | OUTPATIENT
Start: 2025-08-14

## 2025-08-14 RX ORDER — LOSARTAN POTASSIUM 25 MG/1
25 TABLET ORAL DAILY
Qty: 30 TABLET | Refills: 2 | Status: SHIPPED | OUTPATIENT
Start: 2025-08-14

## (undated) DEVICE — CONN JET HYDRA H20 AUXILIARY DISP

## (undated) DEVICE — THE SINGLE USE ETRAP – POLYP TRAP IS USED FOR SUCTION RETRIEVAL OF ENDOSCOPICALLY REMOVED POLYPS.: Brand: ETRAP

## (undated) DEVICE — LINER SURG CANSTR SXN S/RIGD 1500CC

## (undated) DEVICE — Device

## (undated) DEVICE — SOL IRR NACL 0.9PCT BO 1000ML

## (undated) DEVICE — GLV SURG BIOGEL LTX PF 7 1/2

## (undated) DEVICE — SOL IRRG H2O PL/BG 1000ML STRL

## (undated) DEVICE — SOLIDIFIER LIQLOC PLS 1500CC BT

## (undated) DEVICE — SNAR E/S POLYP SNAREMASTER OVL/10MM 2.8X2300MM YEL

## (undated) DEVICE — THE STERILE LIGHT HANDLE COVER IS USED WITH STERIS SURGICAL LIGHTING AND VISUALIZATION SYSTEMS.

## (undated) DEVICE — DEFENDO AIR WATER SUCTION AND BIOPSY VALVE KIT FOR  OLYMPUS: Brand: DEFENDO AIR/WATER/SUCTION AND BIOPSY VALVE